# Patient Record
Sex: FEMALE | Race: OTHER | NOT HISPANIC OR LATINO
[De-identification: names, ages, dates, MRNs, and addresses within clinical notes are randomized per-mention and may not be internally consistent; named-entity substitution may affect disease eponyms.]

---

## 2023-09-27 ENCOUNTER — APPOINTMENT (OUTPATIENT)
Dept: OTOLARYNGOLOGY | Facility: CLINIC | Age: 23
End: 2023-09-27
Payer: COMMERCIAL

## 2023-09-27 ENCOUNTER — NON-APPOINTMENT (OUTPATIENT)
Age: 23
End: 2023-09-27

## 2023-09-27 DIAGNOSIS — J35.3 HYPERTROPHY OF TONSILS WITH HYPERTROPHY OF ADENOIDS: ICD-10-CM

## 2023-09-27 DIAGNOSIS — G47.9 SLEEP DISORDER, UNSPECIFIED: ICD-10-CM

## 2023-09-27 DIAGNOSIS — H91.93 UNSPECIFIED HEARING LOSS, BILATERAL: ICD-10-CM

## 2023-09-27 DIAGNOSIS — G47.33 OBSTRUCTIVE SLEEP APNEA (ADULT) (PEDIATRIC): ICD-10-CM

## 2023-09-27 DIAGNOSIS — H60.93 UNSPECIFIED OTITIS EXTERNA, BILATERAL: ICD-10-CM

## 2023-09-27 PROBLEM — Z00.00 ENCOUNTER FOR PREVENTIVE HEALTH EXAMINATION: Status: ACTIVE | Noted: 2023-09-27

## 2023-09-27 PROCEDURE — 99203 OFFICE O/P NEW LOW 30 MIN: CPT | Mod: 25

## 2023-09-27 PROCEDURE — 31575 DIAGNOSTIC LARYNGOSCOPY: CPT

## 2023-10-03 ENCOUNTER — APPOINTMENT (OUTPATIENT)
Dept: OTOLARYNGOLOGY | Facility: CLINIC | Age: 23
End: 2023-10-03
Payer: COMMERCIAL

## 2023-10-03 VITALS
BODY MASS INDEX: 41.54 KG/M2 | OXYGEN SATURATION: 97 % | SYSTOLIC BLOOD PRESSURE: 137 MMHG | HEIGHT: 61 IN | WEIGHT: 220 LBS | TEMPERATURE: 98 F | DIASTOLIC BLOOD PRESSURE: 83 MMHG | HEART RATE: 100 BPM

## 2023-10-03 DIAGNOSIS — G47.33 OBSTRUCTIVE SLEEP APNEA (ADULT) (PEDIATRIC): ICD-10-CM

## 2023-10-03 PROCEDURE — 69801 INCISE INNER EAR: CPT | Mod: RT

## 2023-10-03 PROCEDURE — 99214 OFFICE O/P EST MOD 30 MIN: CPT | Mod: 25

## 2023-10-03 PROCEDURE — 92557 COMPREHENSIVE HEARING TEST: CPT

## 2023-10-03 PROCEDURE — 92550 TYMPANOMETRY & REFLEX THRESH: CPT | Mod: 52

## 2023-10-03 RX ORDER — OFLOXACIN OTIC 3 MG/ML
0.3 SOLUTION AURICULAR (OTIC)
Qty: 1 | Refills: 1 | Status: ACTIVE | COMMUNITY
Start: 2023-10-03 | End: 1900-01-01

## 2023-10-11 ENCOUNTER — APPOINTMENT (OUTPATIENT)
Dept: OTOLARYNGOLOGY | Facility: CLINIC | Age: 23
End: 2023-10-11
Payer: COMMERCIAL

## 2023-10-11 VITALS
BODY MASS INDEX: 41.54 KG/M2 | HEART RATE: 120 BPM | WEIGHT: 220 LBS | HEIGHT: 61 IN | TEMPERATURE: 97.1 F | OXYGEN SATURATION: 96 % | SYSTOLIC BLOOD PRESSURE: 125 MMHG | DIASTOLIC BLOOD PRESSURE: 72 MMHG

## 2023-10-11 DIAGNOSIS — E34.8 OTHER SPECIFIED ENDOCRINE DISORDERS: ICD-10-CM

## 2023-10-11 PROCEDURE — 99214 OFFICE O/P EST MOD 30 MIN: CPT | Mod: 25

## 2023-10-11 PROCEDURE — 92557 COMPREHENSIVE HEARING TEST: CPT

## 2023-10-11 PROCEDURE — 69801 INCISE INNER EAR: CPT | Mod: RT

## 2023-10-12 ENCOUNTER — RX RENEWAL (OUTPATIENT)
Age: 23
End: 2023-10-12

## 2023-10-16 ENCOUNTER — OUTPATIENT (OUTPATIENT)
Dept: OUTPATIENT SERVICES | Facility: HOSPITAL | Age: 23
LOS: 1 days | End: 2023-10-16
Payer: COMMERCIAL

## 2023-10-16 ENCOUNTER — APPOINTMENT (OUTPATIENT)
Dept: SLEEP CENTER | Facility: HOME HEALTH | Age: 23
End: 2023-10-16
Payer: COMMERCIAL

## 2023-10-16 PROCEDURE — 95800 SLP STDY UNATTENDED: CPT | Mod: 26

## 2023-10-16 PROCEDURE — 95800 SLP STDY UNATTENDED: CPT

## 2023-10-19 DIAGNOSIS — G47.33 OBSTRUCTIVE SLEEP APNEA (ADULT) (PEDIATRIC): ICD-10-CM

## 2023-10-20 ENCOUNTER — APPOINTMENT (OUTPATIENT)
Dept: OTOLARYNGOLOGY | Facility: CLINIC | Age: 23
End: 2023-10-20
Payer: COMMERCIAL

## 2023-10-20 VITALS
WEIGHT: 220 LBS | HEIGHT: 61 IN | OXYGEN SATURATION: 97 % | TEMPERATURE: 98 F | BODY MASS INDEX: 41.54 KG/M2 | SYSTOLIC BLOOD PRESSURE: 125 MMHG | HEART RATE: 110 BPM | DIASTOLIC BLOOD PRESSURE: 84 MMHG

## 2023-10-20 DIAGNOSIS — H90.41 SENSORINEURAL HEARING LOSS, UNILATERAL, RIGHT EAR, WITH UNRESTRICTED HEARING ON THE CONTRALATERAL SIDE: ICD-10-CM

## 2023-10-20 DIAGNOSIS — H93.11 TINNITUS, RIGHT EAR: ICD-10-CM

## 2023-10-20 PROCEDURE — 92557 COMPREHENSIVE HEARING TEST: CPT

## 2023-10-20 PROCEDURE — 99213 OFFICE O/P EST LOW 20 MIN: CPT

## 2023-10-24 ENCOUNTER — APPOINTMENT (OUTPATIENT)
Dept: OTOLARYNGOLOGY | Facility: CLINIC | Age: 23
End: 2023-10-24

## 2023-11-03 ENCOUNTER — TRANSCRIPTION ENCOUNTER (OUTPATIENT)
Age: 23
End: 2023-11-03

## 2023-11-17 ENCOUNTER — APPOINTMENT (OUTPATIENT)
Dept: OTOLARYNGOLOGY | Facility: CLINIC | Age: 23
End: 2023-11-17
Payer: COMMERCIAL

## 2023-11-17 DIAGNOSIS — H91.21 SUDDEN IDIOPATHIC HEARING LOSS, RIGHT EAR: ICD-10-CM

## 2023-11-17 DIAGNOSIS — R68.89 OTHER GENERAL SYMPTOMS AND SIGNS: ICD-10-CM

## 2023-11-17 DIAGNOSIS — H92.01 OTALGIA, RIGHT EAR: ICD-10-CM

## 2023-11-17 PROCEDURE — 99213 OFFICE O/P EST LOW 20 MIN: CPT

## 2023-11-17 PROCEDURE — 92550 TYMPANOMETRY & REFLEX THRESH: CPT | Mod: 52

## 2023-11-17 PROCEDURE — 92557 COMPREHENSIVE HEARING TEST: CPT

## 2023-12-15 ENCOUNTER — APPOINTMENT (OUTPATIENT)
Dept: OTOLARYNGOLOGY | Facility: CLINIC | Age: 23
End: 2023-12-15
Payer: COMMERCIAL

## 2023-12-15 VITALS
DIASTOLIC BLOOD PRESSURE: 82 MMHG | SYSTOLIC BLOOD PRESSURE: 134 MMHG | WEIGHT: 220 LBS | HEIGHT: 61 IN | OXYGEN SATURATION: 92 % | HEART RATE: 98 BPM | BODY MASS INDEX: 41.54 KG/M2 | TEMPERATURE: 98 F

## 2023-12-15 DIAGNOSIS — H69.90 UNSPECIFIED EUSTACHIAN TUBE DISORDER, UNSPECIFIED EAR: ICD-10-CM

## 2023-12-15 DIAGNOSIS — H92.03 OTALGIA, BILATERAL: ICD-10-CM

## 2023-12-15 PROCEDURE — 31231 NASAL ENDOSCOPY DX: CPT

## 2023-12-15 PROCEDURE — 92567 TYMPANOMETRY: CPT

## 2023-12-15 PROCEDURE — 99214 OFFICE O/P EST MOD 30 MIN: CPT | Mod: 25

## 2023-12-15 PROCEDURE — 92557 COMPREHENSIVE HEARING TEST: CPT

## 2023-12-15 NOTE — PROCEDURE
[Posterior Lesion] : posterior lesion [Anterior rhinoscopy insufficient to account for symptoms] : anterior rhinoscopy insufficient to account for symptoms [Flexible Endoscope] : examined with the flexible endoscope [Normal] : the paranasal sinuses had no abnormalities [FreeTextEntry6] : scope - npx clear - done due to etd to ro npx mass Mucosa: b nl  Mucus:b nl Polyps:b nl Inferior turbinate:b nl Middle turbinate:b nl Superior turbinate:b nl Inferior Meatus:b nl Middle Meatus:b nl Superior meatus:b nl Sphenoethmoidal recess:b nl

## 2023-12-15 NOTE — ASSESSMENT
[FreeTextEntry1] : etd flonase (she has it at home) singuiair avoid allergens; asked ot try to have guinea pigs in a different room than her bedroom see Dr Padilla (staff asked to give her his contact info) rtc 1 mo

## 2023-12-15 NOTE — HISTORY OF PRESENT ILLNESS
[de-identified] : 1 mo follow up appt for this 24 yo f who had had r sudden hl- treated successfully with medications. She reports she is getting episodic ringing about 1x/week in l ear and aural fullness on r which is dull. -inciting event -f/s/c she has guinea pigs in her bedroom and reports that the hay makes her sneeze.She has neuro appt pending for abnormality seen on mri.

## 2023-12-27 ENCOUNTER — APPOINTMENT (OUTPATIENT)
Dept: NEUROLOGY | Facility: CLINIC | Age: 23
End: 2023-12-27
Payer: COMMERCIAL

## 2023-12-27 VITALS
OXYGEN SATURATION: 95 % | HEART RATE: 100 BPM | SYSTOLIC BLOOD PRESSURE: 122 MMHG | WEIGHT: 223 LBS | TEMPERATURE: 97.3 F | DIASTOLIC BLOOD PRESSURE: 81 MMHG | HEIGHT: 61 IN | BODY MASS INDEX: 42.1 KG/M2

## 2023-12-27 DIAGNOSIS — G93.9 DISORDER OF BRAIN, UNSPECIFIED: ICD-10-CM

## 2023-12-27 PROCEDURE — 99205 OFFICE O/P NEW HI 60 MIN: CPT

## 2023-12-27 RX ORDER — BUPROPION HYDROCHLORIDE 200 MG/1
200 TABLET, FILM COATED, EXTENDED RELEASE ORAL DAILY
Refills: 0 | Status: ACTIVE | COMMUNITY

## 2023-12-27 RX ORDER — OMEPRAZOLE 40 MG/1
40 CAPSULE, DELAYED RELEASE ORAL
Qty: 14 | Refills: 0 | Status: DISCONTINUED | COMMUNITY
Start: 2023-10-03 | End: 2023-12-27

## 2023-12-27 RX ORDER — ACETIC ACID 20 MG/ML
2 SOLUTION AURICULAR (OTIC) 3 TIMES DAILY
Qty: 1 | Refills: 4 | Status: DISCONTINUED | COMMUNITY
Start: 2023-09-27 | End: 2023-12-27

## 2023-12-27 RX ORDER — PREDNISONE 10 MG/1
10 TABLET ORAL
Qty: 45 | Refills: 0 | Status: DISCONTINUED | COMMUNITY
Start: 2023-10-03 | End: 2023-12-27

## 2023-12-27 RX ORDER — DEXTROAMPHETAMINE SACCHARATE, AMPHETAMINE ASPARTATE, DEXTROAMPHETAMINE SULFATE AND AMPHETAMINE SULFATE 2.5; 2.5; 2.5; 2.5 MG/1; MG/1; MG/1; MG/1
10 TABLET ORAL DAILY
Refills: 0 | Status: ACTIVE | COMMUNITY

## 2023-12-27 RX ORDER — MONTELUKAST 10 MG/1
10 TABLET, FILM COATED ORAL
Qty: 90 | Refills: 1 | Status: DISCONTINUED | COMMUNITY
Start: 2023-12-15 | End: 2023-12-27

## 2023-12-27 RX ORDER — NORETHINDRONE ACETATE AND ETHINYL ESTRADIOL 1MG-20(21)
KIT ORAL DAILY
Refills: 0 | Status: ACTIVE | COMMUNITY

## 2023-12-27 NOTE — HISTORY OF PRESENT ILLNESS
[FreeTextEntry1] : Reason for consult: brain lesion  HPI:  COVID 9/2023 Hearing loss on her R ear a week after resolution of COVID symptoms. Saw ENT, confirmed hearing loss on the R ear. Prescribed IV and oral prednisone, good response (recovered most of her hearing). 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  ROS/Current Sx: hearing loss improved. no neurological sx  PMHX: Hx of migraines in HS. Now improved but still gets from time to time. (never took medication for it) ADHD Anxiety disorder  MEDS: Bupropion 200mg qd Adderall 10mg qd (M-F) Blisovi (birth control)  ALL: NKDA  SHx: Occasional social drinker (1x drink) No tobacco or recreational drug use.  FHx: NC  Vitals: unremarkable  Exam:  AO3.  Normally conversant.  Follows commands, names, and repeats.  Good attention.  PERRL, VFF, EOMI, no nystagmus, face symmetric, TUP at midline.  Motor:                                                  R:                               L: Del                                           5                                5 Bi                                              5                               5 Tri                                            5                               5 Wrist Extensors                      5                               5 Finger abductors                    5                               5                                         5                               5   HF                                           5                               5 KE                                           5                               5 KF                                           5                               5 DF                                           5                               5 PF                                           5                               5  Tone                                       R                               L UE                                          0                                0  LE                                          0                                0  Sensory                                RUE                      LUE                 RLE                LLE      LT                                           +                            +                      +                   + Vib                                          +                            +                      +                   + JPS                                         +                            +                      +                   + PP                                         +                            +                      +                   + Temp                                     +                            +                      +                   +  Reflexes:                                              R                             L                             Biceps                                  2                             2 BR                                        2                             2 Triceps                                2                              2 Pat                                        2                            2  AJ                                        2                             2  TOES                                    F                            F  Coordination:                                              R                             L                        FTN                                       0                             0  PRESTON                                      0                            0 HTS                                      0                             0   Other                                                                             Gait: normal, can heel, toe, tandem                      Assistance: none   MRI brain 10/2023 - isolated small round R (possibly periventricular). non-enhancing. some t2 shinethrough but no restriction.    AP: 24yo w/ hearing loss s/p covid that improved with steroids, who incidentally was found to have a nonspecific R sided potentially periventricular white matter lesion on brain MRI, along with an incidental pineal cyst. We discussed that both of these are incidental and unrelated to her now resolving ENT symptoms.   all questions answered, education provided, management discussed at length,  - repeat MRI brain w and wo in 4/2024, then 4/2025 if no change. then would avoid further routine surveillance. - RTC 6m, sooner prn

## 2024-01-26 ENCOUNTER — APPOINTMENT (OUTPATIENT)
Dept: OTOLARYNGOLOGY | Facility: CLINIC | Age: 24
End: 2024-01-26
Payer: COMMERCIAL

## 2024-01-26 VITALS
OXYGEN SATURATION: 96 % | WEIGHT: 220 LBS | TEMPERATURE: 98 F | SYSTOLIC BLOOD PRESSURE: 133 MMHG | DIASTOLIC BLOOD PRESSURE: 89 MMHG | HEIGHT: 61 IN | HEART RATE: 100 BPM | BODY MASS INDEX: 41.54 KG/M2

## 2024-01-26 DIAGNOSIS — R47.89 OTHER SPEECH DISTURBANCES: ICD-10-CM

## 2024-01-26 DIAGNOSIS — Z78.9 OTHER SPECIFIED HEALTH STATUS: ICD-10-CM

## 2024-01-26 DIAGNOSIS — J35.2 HYPERTROPHY OF ADENOIDS: ICD-10-CM

## 2024-01-26 DIAGNOSIS — H69.90 UNSPECIFIED EUSTACHIAN TUBE DISORDER, UNSPECIFIED EAR: ICD-10-CM

## 2024-01-26 PROCEDURE — 92511 NASOPHARYNGOSCOPY: CPT

## 2024-01-26 PROCEDURE — 99213 OFFICE O/P EST LOW 20 MIN: CPT | Mod: 25

## 2024-01-26 PROCEDURE — 92557 COMPREHENSIVE HEARING TEST: CPT

## 2024-01-26 PROCEDURE — 92550 TYMPANOMETRY & REFLEX THRESH: CPT | Mod: 52

## 2024-01-26 NOTE — HISTORY OF PRESENT ILLNESS
[de-identified] : still gets reverb feeling from high pitched noises or if she lies down. getds it on b sides ?mucus in et afrin

## 2024-01-27 PROBLEM — H69.90 ACUTE DYSFUNCTION OF EUSTACHIAN TUBE, UNSPECIFIED LATERALITY: Status: ACTIVE | Noted: 2024-01-27

## 2024-01-27 PROBLEM — R47.89 DISTORTION OF SPEECH SOUNDS: Status: ACTIVE | Noted: 2024-01-27

## 2024-01-27 PROBLEM — J35.2 ADENOID HYPERTROPHY: Status: ACTIVE | Noted: 2024-01-27

## 2024-04-02 ENCOUNTER — OUTPATIENT (OUTPATIENT)
Dept: OUTPATIENT SERVICES | Facility: HOSPITAL | Age: 24
LOS: 1 days | End: 2024-04-02
Payer: COMMERCIAL

## 2024-04-02 ENCOUNTER — APPOINTMENT (OUTPATIENT)
Dept: MRI IMAGING | Facility: HOSPITAL | Age: 24
End: 2024-04-02

## 2024-04-02 PROCEDURE — 70553 MRI BRAIN STEM W/O & W/DYE: CPT | Mod: 26

## 2024-04-02 PROCEDURE — A9585: CPT

## 2024-04-02 PROCEDURE — 70553 MRI BRAIN STEM W/O & W/DYE: CPT

## 2024-04-03 ENCOUNTER — NON-APPOINTMENT (OUTPATIENT)
Age: 24
End: 2024-04-03

## 2024-04-19 ENCOUNTER — APPOINTMENT (OUTPATIENT)
Dept: NEUROLOGY | Facility: CLINIC | Age: 24
End: 2024-04-19
Payer: COMMERCIAL

## 2024-04-19 VITALS
OXYGEN SATURATION: 95 % | HEART RATE: 126 BPM | TEMPERATURE: 94.4 F | WEIGHT: 220 LBS | HEIGHT: 61 IN | SYSTOLIC BLOOD PRESSURE: 115 MMHG | DIASTOLIC BLOOD PRESSURE: 79 MMHG | BODY MASS INDEX: 41.54 KG/M2

## 2024-04-19 PROCEDURE — G2211 COMPLEX E/M VISIT ADD ON: CPT

## 2024-04-19 PROCEDURE — 99214 OFFICE O/P EST MOD 30 MIN: CPT

## 2025-04-18 ENCOUNTER — APPOINTMENT (OUTPATIENT)
Dept: NEUROLOGY | Facility: CLINIC | Age: 25
End: 2025-04-18